# Patient Record
Sex: FEMALE | Race: WHITE | NOT HISPANIC OR LATINO | URBAN - METROPOLITAN AREA
[De-identification: names, ages, dates, MRNs, and addresses within clinical notes are randomized per-mention and may not be internally consistent; named-entity substitution may affect disease eponyms.]

---

## 2022-12-30 ENCOUNTER — PROBLEM (OUTPATIENT)
Dept: URBAN - METROPOLITAN AREA CLINIC 79 | Facility: CLINIC | Age: 72
End: 2022-12-30

## 2022-12-30 DIAGNOSIS — H04.123: ICD-10-CM

## 2022-12-30 DIAGNOSIS — H40.1230: ICD-10-CM

## 2022-12-30 PROCEDURE — 92134 CPTRZ OPH DX IMG PST SGM RTA: CPT | Mod: NC

## 2022-12-30 PROCEDURE — 99213 OFFICE O/P EST LOW 20 MIN: CPT

## 2022-12-30 ASSESSMENT — VISUAL ACUITY
OS_SC: 20/60+2
OD_SC: 20/25-2
OS_CC: 20/40
OD_CC: 20/20

## 2022-12-30 ASSESSMENT — TONOMETRY
OS_IOP_MMHG: 5
OD_IOP_MMHG: 15

## 2023-01-13 ENCOUNTER — FOLLOW UP (OUTPATIENT)
Dept: URBAN - METROPOLITAN AREA CLINIC 79 | Facility: CLINIC | Age: 73
End: 2023-01-13

## 2023-01-13 DIAGNOSIS — H04.123: ICD-10-CM

## 2023-01-13 DIAGNOSIS — H18.452: ICD-10-CM

## 2023-01-13 PROCEDURE — 99213 OFFICE O/P EST LOW 20 MIN: CPT

## 2023-01-13 ASSESSMENT — TONOMETRY
OD_IOP_MMHG: 23
OS_IOP_MMHG: 6
OD_IOP_MMHG: 23

## 2023-01-13 ASSESSMENT — VISUAL ACUITY
OD_SC: 20/30+2
OS_SC: 20/60-2
OS_PH: 20/30

## 2023-02-03 ENCOUNTER — FOLLOW UP (OUTPATIENT)
Dept: URBAN - METROPOLITAN AREA CLINIC 79 | Facility: CLINIC | Age: 73
End: 2023-02-03

## 2023-02-03 DIAGNOSIS — H04.123: ICD-10-CM

## 2023-02-03 DIAGNOSIS — H02.881: ICD-10-CM

## 2023-02-03 DIAGNOSIS — H02.884: ICD-10-CM

## 2023-02-03 DIAGNOSIS — H40.1230: ICD-10-CM

## 2023-02-03 PROCEDURE — 92012 INTRM OPH EXAM EST PATIENT: CPT

## 2023-02-03 ASSESSMENT — TONOMETRY
OS_IOP_MMHG: 7
OS_IOP_MMHG: 5
OD_IOP_MMHG: 24
OD_IOP_MMHG: 24

## 2023-02-03 ASSESSMENT — VISUAL ACUITY
OS_SC: 20/60+2
OD_SC: 20/30-2
OS_PH: 20/40+1

## 2023-03-03 ENCOUNTER — FOLLOW UP (OUTPATIENT)
Dept: URBAN - METROPOLITAN AREA CLINIC 79 | Facility: CLINIC | Age: 73
End: 2023-03-03

## 2023-03-03 DIAGNOSIS — H25.13: ICD-10-CM

## 2023-03-03 DIAGNOSIS — H04.123: ICD-10-CM

## 2023-03-03 DIAGNOSIS — H18.452: ICD-10-CM

## 2023-03-03 DIAGNOSIS — H02.884: ICD-10-CM

## 2023-03-03 DIAGNOSIS — H40.1230: ICD-10-CM

## 2023-03-03 DIAGNOSIS — H02.881: ICD-10-CM

## 2023-03-03 PROCEDURE — 92012 INTRM OPH EXAM EST PATIENT: CPT

## 2023-03-03 ASSESSMENT — VISUAL ACUITY
OD_SC: 20/30+1
OS_SC: 20/60-2

## 2023-03-03 ASSESSMENT — TONOMETRY
OD_IOP_MMHG: 20
OS_IOP_MMHG: 5

## 2023-05-12 ENCOUNTER — FOLLOW UP (OUTPATIENT)
Dept: URBAN - METROPOLITAN AREA CLINIC 79 | Facility: CLINIC | Age: 73
End: 2023-05-12

## 2023-05-12 DIAGNOSIS — H04.123: ICD-10-CM

## 2023-05-12 DIAGNOSIS — H02.881: ICD-10-CM

## 2023-05-12 DIAGNOSIS — H02.884: ICD-10-CM

## 2023-05-12 PROCEDURE — 92012 INTRM OPH EXAM EST PATIENT: CPT

## 2023-05-12 ASSESSMENT — TONOMETRY
OS_IOP_MMHG: 15
OD_IOP_MMHG: 33
OD_IOP_MMHG: 26
OS_IOP_MMHG: 16

## 2023-05-12 ASSESSMENT — VISUAL ACUITY
OS_SC: 20/40
OS_PH: 20/30
OD_SC: 20/30

## 2023-07-05 ENCOUNTER — PROBLEM (OUTPATIENT)
Dept: URBAN - METROPOLITAN AREA CLINIC 79 | Facility: CLINIC | Age: 73
End: 2023-07-05

## 2023-07-05 DIAGNOSIS — H02.884: ICD-10-CM

## 2023-07-05 DIAGNOSIS — H25.13: ICD-10-CM

## 2023-07-05 DIAGNOSIS — H02.881: ICD-10-CM

## 2023-07-05 DIAGNOSIS — H04.123: ICD-10-CM

## 2023-07-05 PROCEDURE — 92134 CPTRZ OPH DX IMG PST SGM RTA: CPT | Mod: NC

## 2023-07-05 PROCEDURE — 92012 INTRM OPH EXAM EST PATIENT: CPT

## 2023-07-05 ASSESSMENT — TONOMETRY
OS_IOP_MMHG: 10
OD_IOP_MMHG: 25
OD_IOP_MMHG: 27
OS_IOP_MMHG: 14

## 2023-07-05 ASSESSMENT — VISUAL ACUITY
OD_PH: 20/25+2
OS_PH: 20/30
OD_SC: 20/30
OS_SC: 20/50-2

## 2024-07-08 ENCOUNTER — HOSPITAL ENCOUNTER (OUTPATIENT)
Dept: HOSPITAL 99 - HWRAD | Age: 74
End: 2024-07-08
Payer: MEDICARE

## 2024-07-08 DIAGNOSIS — E04.1: Primary | ICD-10-CM

## 2024-08-09 ENCOUNTER — HOSPITAL ENCOUNTER (OUTPATIENT)
Dept: HOSPITAL 99 - RCS | Age: 74
End: 2024-08-09
Payer: MEDICARE

## 2024-08-09 DIAGNOSIS — H25.812: Primary | ICD-10-CM

## 2024-08-27 ENCOUNTER — OFFICE VISIT (OUTPATIENT)
Dept: GASTROENTEROLOGY | Facility: CLINIC | Age: 74
End: 2024-08-27
Payer: MEDICARE

## 2024-08-27 VITALS
HEIGHT: 62 IN | DIASTOLIC BLOOD PRESSURE: 82 MMHG | SYSTOLIC BLOOD PRESSURE: 122 MMHG | BODY MASS INDEX: 20.06 KG/M2 | WEIGHT: 109 LBS

## 2024-08-27 DIAGNOSIS — R15.1 FECAL SOILING: ICD-10-CM

## 2024-08-27 DIAGNOSIS — R19.8 ALTERNATING CONSTIPATION AND DIARRHEA: Primary | ICD-10-CM

## 2024-08-27 DIAGNOSIS — Z86.010 PERSONAL HISTORY OF COLONIC POLYPS: ICD-10-CM

## 2024-08-27 PROCEDURE — 99203 OFFICE O/P NEW LOW 30 MIN: CPT | Performed by: STUDENT IN AN ORGANIZED HEALTH CARE EDUCATION/TRAINING PROGRAM

## 2024-08-27 RX ORDER — ESCITALOPRAM OXALATE 10 MG/1
10 TABLET ORAL DAILY
COMMUNITY

## 2024-08-27 RX ORDER — CLONAZEPAM 1 MG/1
1 TABLET ORAL DAILY
COMMUNITY

## 2024-08-27 NOTE — PATIENT INSTRUCTIONS
1.  I am concerned that you are having constipation given your alternating constipation and diarrhea.  Given that you did not respond to MiraLAX in the past and had issues with the Metamucil you can consider over-the-counter stool softener such as Colace.  If you perform more natural route you can try certain foods including prunes, dates and kiwi fruit.  Keep a food is actually been shown to be very good for constipation and regulating of the bowels, it is recommended to take 1 to 2/week.    2.  We will get some testing including a C-reactive protein to look for body inflammation, fecal calprotectin for colonic inflammation and a breath test to rule out small intestinal bacterial overgrowth and intestinal methane agenic overgrowth.    3.  We will plan to see back in 2 to 3 months for reassessment.  If you continue to have hemorrhoidal symptoms we can also talk about banding treatments.

## 2024-08-27 NOTE — PROGRESS NOTES
Consultation - [facility]     Indira Henley 74 y.o. female MRN: [id number]  Unit/Bed#:  Encounter: [id number]    Consults    ASSESSMENT and PLAN    1. Alternating constipation and diarrhea  I do suspect constipation with overflow diarrhea.  Unfortunately she has not had relief with MiraLAX in the past and Metamucil caused too many side effects.  We discussed foods which are high in fiber and good for the GI tract such as dates, prunes and kiwi fruit, 1 to 2/week.  We also discussed that she can use over-the-counter stool softeners to try to help with the symptoms.  Will check breath test for SIBO, CRP and fecal calprotectin.  - Small intestinal bacterial overgrowth  - C-reactive protein; Future  - Calprotectin,Fecal; Future  - C-reactive protein  - Calprotectin,Fecal    2. Fecal soiling  Likely hemorrhoidal, we can assess if she would need banding in the future.    3. Personal history of colonic polyps  She reportedly had colon polyps in 2019 and was advised that she does not need any more colonoscopies.  If she has ongoing symptoms we may need to consider diagnostic colonoscopy +/- EGD.      Chief Complaint   Patient presents with    Rectal leakage, mucous, diarrhea     Referred by Dr. Rowena Alfonso  Pt questions if she has SIBO         HPI  Indira Henley is a 74 y.o. year old female with a past medical history of polyps, hemorrhoids, who presents for new GI issues including alternating constipation, diarrhea, mucousy stool which is foul-smelling and anal leakage.  Symptoms began recently when she was on a trip to [place].  They have improved somewhat but she is still having off-and-on constipation diarrhea and occasional leakage.  She saw her primary care doctor who did baseline test including blood work, C. difficile and parasite screening which was negative.  She also has a decrease in appetite and has been losing some weight however she runs 5 miles per day.  She had a colonoscopy at  "age 51 which was normal and another one in 2019 in [place] with small polyps and was advised at that time that she did not need anymore.  She is not having any significant upper GI symptoms.  She has had a rectocele repair.    She has had constipation previously and tried MiraLAX which did not work for her.  Metamucil/fiber supplementation led to too much bloating and other side effects.  She has her doctor about SIBO testing and was referred here.    GI History:  Prior Colonoscopy: Age 16 normal, 2019 small polyps, advised no need for recall  Prior EGD:  No prior EGD  Family hx:  No reported first degree relatives with colorectal cancer  Surgical hx: Rectocele repair  Blood thinners: Denies antiplatelet or anticoagulation use  NSAID use: Denies regular NSAID use  DM meds: None  Social Hx: No regular ETOH, smoking, or drug use.          Historical Information   Past Medical History:   Diagnosis Date    Colon polyp      Past Surgical History:   Procedure Laterality Date    COLONOSCOPY      HYSTERECTOMY      PARATHYROIDECTOMY      REPAIR RECTOCELE      TOTAL HIP ARTHROPLASTY Bilateral     UPPER GASTROINTESTINAL ENDOSCOPY       Social History   Social History     Substance and Sexual Activity   Alcohol Use Yes     Social History     Substance and Sexual Activity   Drug Use Not on file     Social History     Tobacco Use   Smoking Status Former    Types: Cigarettes   Smokeless Tobacco Never     Family History   Problem Relation Age of Onset    Colon cancer Neg Hx     Colon polyps Neg Hx        Meds/Allergies     No current facility-administered medications for this visit.     Not in a hospital admission.    No Known Allergies    PHYSICAL EXAM    Visit Vitals  /82   Ht 5' 2\" (1.575 m)   Wt 49.4 kg (109 lb)   BMI 19.94 kg/m²   Smoking Status Former   BSA 1.48 m²     Body mass index is 19.94 kg/m².  General Appearance: NAD, cooperative, alert  Eyes: Anicteric, EOMI  ENT: Normocephalic, atraumatic, normal mucosa  Neck: " "symmetrical, trachea midline  Lungs: clear to auscultation, non-labored respirations on room air, no wheezing  CV: S1 S2+ radial pulses bilaterally  GI:  Soft, non-tender, non-distended; normal bowel sounds; no masses, no organomegaly   Rectal: Deferred  Musculoskeletal: No gross deformities or pitting edema  Skin:  No jaundice, no rashes  Neurologic: awake, alert, oriented, no gross deficits    Lab Results   Component Value Date    CALCIUM 10.2 04/24/2023    K 4.9 04/24/2023    CO2 29 04/24/2023     (L) 04/24/2023    BUN 23 (H) 04/24/2023    CREATININE 0.81 04/24/2023     No results found for: \"WBC\", \"HGB\", \"MCV\", \"PLT\"  No results found for: \"ALT\", \"AST\", \"GGT\", \"ALKPHOS\", \"TBILI\"  No results found for: \"AMYLASE\"  No results found for: \"LIPASE\"  No results found for: \"IRON\", \"TIBC\", \"FERRITIN\"  No results found for: \"INR\"    No results found.    Imaging Studies: I have personally reviewed pertinent reports.      Pathology, and Other Studies: I have personally reviewed pertinent reports.      REVIEW OF SYSTEMS    CONSTITUTIONAL: negative unless stated in HPI  GASTROINTESTINAL: As noted in the HPI        "

## 2024-09-05 LAB — CRP SERPL-MCNC: <3 MG/L

## 2024-09-06 ENCOUNTER — TELEPHONE (OUTPATIENT)
Age: 74
End: 2024-09-06

## 2024-09-06 ENCOUNTER — OFFICE VISIT (OUTPATIENT)
Dept: GASTROENTEROLOGY | Facility: CLINIC | Age: 74
End: 2024-09-06
Payer: MEDICARE

## 2024-09-06 DIAGNOSIS — R19.7 DIARRHEA, UNSPECIFIED TYPE: ICD-10-CM

## 2024-09-06 DIAGNOSIS — K59.00 CONSTIPATION, UNSPECIFIED CONSTIPATION TYPE: Primary | ICD-10-CM

## 2024-09-06 PROCEDURE — 91065 BREATH HYDROGEN/METHANE TEST: CPT | Performed by: STUDENT IN AN ORGANIZED HEALTH CARE EDUCATION/TRAINING PROGRAM

## 2024-09-06 NOTE — TELEPHONE ENCOUNTER
Patients GI provider:  Dr. Jean-Baptiste    Number to return call: [phone]     Reason for call: Pt calling finished breath test and will drop it off at the office today.     Scheduled procedure/appointment date if applicable: Appt 11/20

## 2024-09-09 NOTE — PROGRESS NOTES
[facility]       Bacterial Overgrowth Analytical Record    Indira Crew 74 y.o. female MRN: [id number]      Date of Test: 09/05/2024    Substrate Given: Lactulose    Ordering Provider: Glenn Jean-Baptiste DO    Medical Assistant: BHASKAR    Symptoms: diarrhea alternating with constipation    The patient presents for bacterial overgrowth testing.    Patient fasted overnight. Baseline readings obtained.   Breath test performed every 20 min for a total of 3 hr    Sample Clock Time ppmH2 ppmCH4 Co2% Deepti   Baseline 1105   3 0 3.3 1.66   #1  20 minutes 1130 4 0 4.0 1.37   #2  40 minutes 1151 4 0 4.1 1.34   #3  60 minutes 1212 5 0 4.0 1.37   #4  80 minutes 1234 6 0 3.4 1.61   #5  100 minutes 1255 3 0 3.7 1.48   #6  120 minutes 116 3 0 4.1 1.34   #7  140 minutes 137 4 0 4.4 1.25   #8  160 minutes 158 5 0 4.2 1.30   #9  180 minutes 218 13 0 3.3 1.66       Physician interpretation: Negative for SIBO

## 2024-09-10 ENCOUNTER — TELEPHONE (OUTPATIENT)
Dept: GASTROENTEROLOGY | Facility: CLINIC | Age: 74
End: 2024-09-10

## 2024-09-17 LAB — CALPROTECTIN STL-MCNT: 17 MCG/G

## 2024-12-02 ENCOUNTER — OFFICE VISIT (OUTPATIENT)
Dept: GASTROENTEROLOGY | Facility: CLINIC | Age: 74
End: 2024-12-02
Payer: MEDICARE

## 2024-12-02 VITALS
HEIGHT: 62 IN | WEIGHT: 114 LBS | DIASTOLIC BLOOD PRESSURE: 76 MMHG | SYSTOLIC BLOOD PRESSURE: 118 MMHG | BODY MASS INDEX: 20.98 KG/M2

## 2024-12-02 DIAGNOSIS — Z86.0100 PERSONAL HISTORY OF COLON POLYPS, UNSPECIFIED: ICD-10-CM

## 2024-12-02 DIAGNOSIS — R19.8 ALTERNATING CONSTIPATION AND DIARRHEA: Primary | ICD-10-CM

## 2024-12-02 PROCEDURE — 99213 OFFICE O/P EST LOW 20 MIN: CPT | Performed by: STUDENT IN AN ORGANIZED HEALTH CARE EDUCATION/TRAINING PROGRAM

## 2024-12-02 RX ORDER — SERTRALINE HYDROCHLORIDE 25 MG/1
25 TABLET, FILM COATED ORAL DAILY
COMMUNITY

## 2024-12-02 NOTE — PROGRESS NOTES
Name: Indira Henley      : 1950      MRN: [id number]  Encounter Provider: Glenn Jean-Baptiste DO  Encounter Date: 2024   Encounter department: [facility]  :  Assessment & Plan  Alternating constipation and diarrhea  Symptoms have essentially improved.  It is curious that she had a colonoscopy 2019 with polyps and was advised that she did not need another one.  I offered to do another colonoscopy to ensure there were no contributing factors but she would like to hold off as she did not enjoy the experience.  We did discuss options and she would like to try FOBT which was ordered for her.  We discussed that if she has a positive test will need to proceed with colonoscopy and she is amenable.  Orders:    Occult blood 1-3, stool; Future    Personal history of colon polyps, unspecified  As above       Will follow-up on basis of FOBT and if symptoms recur    History of Present Illness     HPI  Indira Henley is a 74 y.o. female who presents for follow-up of alternating constipation diarrhea and fecal soilage.  Since her last visit her symptoms have essentially resolved.  She had been taking Ritalin as needed and stopped taking this and things seem to have improved.  She is not seeing any rectal bleeding.  She did have a colonoscopy 2019 with removal of polyps but was told she did not need another one.  [place]  No other red flag signs or symptoms.  She does report having pelvic prolapse in the past including rectocele which was treated surgically.  History obtained from: patient    Review of Systems  Current Outpatient Medications on File Prior to Visit   Medication Sig Dispense Refill    clonazePAM (KlonoPIN) 1 mg tablet Take 1 mg by mouth daily      conjugated estrogens (PREMARIN) 0.625 mg tablet Take 0.625 mg by mouth daily Take daily for 21 days then do not take for 7 days.      sertraline (ZOLOFT) 25 mg tablet Take 25 mg by mouth daily      escitalopram  "(LEXAPRO) 10 mg tablet Take 10 mg by mouth daily (Patient not taking: Reported on 12/2/2024)       No current facility-administered medications on file prior to visit.      Social History     Tobacco Use    Smoking status: Former     Types: Cigarettes    Smokeless tobacco: Never   Vaping Use    Vaping status: Never Used   Substance and Sexual Activity    Alcohol use: Yes    Drug use: Not on file    Sexual activity: Not on file        Objective   /76   Ht 5' 2\" (1.575 m)   Wt 51.7 kg (114 lb)   BMI 20.85 kg/m²      Physical Exam  General Appearance: NAD, cooperative, alert  Eyes: Anicteric  GI:  Soft, non-tender, non-distended; normal bowel sounds; no masses, no organomegaly   Rectal: Deferred  Musculoskeletal: No edema.  Skin:     No jaundice    "

## 2024-12-04 ENCOUNTER — HOSPITAL ENCOUNTER (OUTPATIENT)
Dept: HOSPITAL 99 - RCS | Age: 74
End: 2024-12-04
Payer: MEDICARE

## 2024-12-04 DIAGNOSIS — H25.812: ICD-10-CM

## 2024-12-04 DIAGNOSIS — H25.811: Primary | ICD-10-CM

## 2025-02-10 ENCOUNTER — RESULTS FOLLOW-UP (OUTPATIENT)
Dept: GASTROENTEROLOGY | Facility: CLINIC | Age: 75
End: 2025-02-10

## 2025-04-13 ENCOUNTER — HOSPITAL ENCOUNTER (OUTPATIENT)
Dept: HOSPITAL 99 - MRI 3T | Age: 75
End: 2025-04-13
Payer: MEDICARE

## 2025-04-13 DIAGNOSIS — R41.3: Primary | ICD-10-CM

## 2025-04-13 DIAGNOSIS — F33.1: ICD-10-CM

## 2025-04-13 DIAGNOSIS — Z96.643: ICD-10-CM

## 2025-04-13 DIAGNOSIS — R53.83: ICD-10-CM

## 2025-04-13 DIAGNOSIS — E21.3: ICD-10-CM

## 2025-04-13 DIAGNOSIS — E04.1: ICD-10-CM

## 2025-06-26 ENCOUNTER — NURSE TRIAGE (OUTPATIENT)
Age: 75
End: 2025-06-26

## 2025-06-26 NOTE — TELEPHONE ENCOUNTER
Having fecal leakage/soilage for several months but is getting worse.  Used to happen when walking long distance but now happens all the time.  Using cotton ball at anus and pads.  Has to wear diaper if goes out.  Hx of rectal prolapse.  OV made for 6/30.  Will discuss then.

## 2025-06-30 ENCOUNTER — OFFICE VISIT (OUTPATIENT)
Dept: GASTROENTEROLOGY | Facility: CLINIC | Age: 75
End: 2025-06-30
Payer: MEDICARE

## 2025-06-30 ENCOUNTER — TELEPHONE (OUTPATIENT)
Dept: GASTROENTEROLOGY | Facility: CLINIC | Age: 75
End: 2025-06-30

## 2025-06-30 VITALS
BODY MASS INDEX: 21.49 KG/M2 | DIASTOLIC BLOOD PRESSURE: 74 MMHG | HEIGHT: 61 IN | WEIGHT: 113.8 LBS | SYSTOLIC BLOOD PRESSURE: 130 MMHG

## 2025-06-30 DIAGNOSIS — Z98.890 HISTORY OF REPAIR OF RECTOCELE: ICD-10-CM

## 2025-06-30 DIAGNOSIS — R19.8 CHANGE IN BOWEL FUNCTION: ICD-10-CM

## 2025-06-30 DIAGNOSIS — R15.9 INCONTINENCE OF FECES, UNSPECIFIED FECAL INCONTINENCE TYPE: ICD-10-CM

## 2025-06-30 DIAGNOSIS — E61.1 IRON DEFICIENCY: ICD-10-CM

## 2025-06-30 DIAGNOSIS — Z86.0100 PERSONAL HISTORY OF COLON POLYPS, UNSPECIFIED: Primary | ICD-10-CM

## 2025-06-30 PROCEDURE — 99214 OFFICE O/P EST MOD 30 MIN: CPT | Performed by: STUDENT IN AN ORGANIZED HEALTH CARE EDUCATION/TRAINING PROGRAM

## 2025-06-30 RX ORDER — SODIUM CHLORIDE, SODIUM LACTATE, POTASSIUM CHLORIDE, CALCIUM CHLORIDE 600; 310; 30; 20 MG/100ML; MG/100ML; MG/100ML; MG/100ML
125 INJECTION, SOLUTION INTRAVENOUS CONTINUOUS
OUTPATIENT
Start: 2025-06-30

## 2025-06-30 RX ORDER — METHYLPHENIDATE HYDROCHLORIDE 20 MG/1
1 TABLET ORAL 2 TIMES DAILY
COMMUNITY
Start: 2025-05-09

## 2025-06-30 RX ORDER — SODIUM CHLORIDE, SODIUM LACTATE, POTASSIUM CHLORIDE, CALCIUM CHLORIDE 600; 310; 30; 20 MG/100ML; MG/100ML; MG/100ML; MG/100ML
125 INJECTION, SOLUTION INTRAVENOUS CONTINUOUS
Status: CANCELLED | OUTPATIENT
Start: 2025-06-30

## 2025-06-30 RX ORDER — ESTRADIOL 0.75 MG/1.25G
1.25 GEL, METERED TOPICAL DAILY
COMMUNITY

## 2025-06-30 NOTE — PROGRESS NOTES
Name: Indira Henley      : 1950      MRN: [id number]  Encounter Provider: Glenn Jean-Baptiste DO  Encounter Date: 2025   Encounter department: [facility]  :  Assessment & Plan  Incontinence of feces, unspecified fecal incontinence type  She has recurrence of fecal incontinence mainly with exercising.  Given her history of rectocele and description of symptoms I do suspect pelvic floor disorder.  She last had a colonoscopy  with polyps were removed and reportedly tattooed although advised she did not need a follow-up.  Given recurrence of symptoms and her history, we will proceed with colonoscopy to ensure no structural/mucosal abnormalities contributing to her symptoms or putting her at risk for colon cancer.    KERI performed today with small anterior anal skin tag, mild internal hemorrhoids.  No prolapse.  No masses or abscesses were noted.    Given her pelvic floor history and suspected contribution to her symptoms, will request follow-up with Dr. Morley and if consideration for further testing such as anorectal manometry.       Personal history of colon polyps, unspecified  As above, states she had polyps removed which were tattooed but did not require follow-up.  Given her ongoing symptoms including incontinence, changes in stool and reported history of positive chronic tattoo, proceed with colonoscopy.  Patient is amenable.  Orders:    Colonoscopy; Future    Change in bowel function  Suspect this is due to her pelvic floor issues contributing to dyssynergy defecation, referral as above.  Proceed with colonoscopy as above.  Orders:    Colonoscopy; Future    EGD; Future    Iron deficiency  She exercises regularly and has a balanced diet but states she has B12 and iron deficiency as noted on outside labs, unable to access at this time.  Given that we are can proceed with colonoscopy, will add on an upper endoscopy due to her history of iron deficiency and  evaluate for any enteropathy or atrophic gastritis that could contribute to her B12 abnormalities.  Orders:    Colonoscopy; Future    EGD; Future    History of repair of rectocele  As above, this was already corrected but concerned that there is ongoing pelvic floor pathology given her symptoms including changes in bowels and ongoing fecal soilage.         Assessment & Plan    History of Present Illness   History of Present Illness  Indira is a pleasant 75-year-old female with past medical history of rectocele status post surgical Parasolve years ago presenting for change in bowel habits and fecal leakage.    She reports experiencing significant stool leakage during physical activities such as walking or running, with a higher frequency during walks. This has necessitated the use of cotton balls and pads for protection. Her bowel movements are irregular, alternating between constipation and diarrhea, often accompanied by foul-smelling mucus. She also notes the presence of an external growth around the rectal muscles, which is not painful. Occasionally, her stools float. She has been managing her diarrhea with Imodium.    She had a rectocele repair surgery in 2007 at [place], [place], which she believes may be related to her current symptoms. Prior to the surgery, she experienced stool impaction into her vagina, which improved post-surgery. She finds that physical exercise, particularly jumping, provides some relief. She reports no urinary symptoms, although she occasionally feels the need to urinate when swishing water in her mouth while brushing her teeth. She has a history of low B12 levels and mild anemia.    Her last colonoscopy in 2019 revealed polyps, which were subsequently removed. FOBT earlier this year negative.    PAST SURGICAL HISTORY:  Rectocele repair surgery in 2007 at [place], [place].    SOCIAL HISTORY  Exercise: Running and walking  History obtained from: patient  Review of Systems  "A complete review of systems is negative other than that noted above in the HPI.    Medications Ordered Prior to Encounter[1]   Social History[2]  Current Medications[2]  Objective   /74   Ht 5' 1\" (1.549 m)   Wt 51.6 kg (113 lb 12.8 oz)   BMI 21.50 kg/m²     Physical Exam  General Appearance: NAD, cooperative, alert  Eyes: Anicteric  GI:  Soft, non-tender, non-distended; normal bowel sounds; no masses, no organomegaly   Rectal: Normal tone, anterior skin tag, mild internal hemorrhoids, no abscess, no mass, no prolapse  Musculoskeletal: No edema.  Skin:     No jaundice      Results  Labs   - Stool testing for blood: 12/2024, Negative   - B12 levels: Low   - Complete Blood Count (CBC): Slight anemia  Lab Results: I personally reviewed relevant lab results.                  [1]   Current Outpatient Medications on File Prior to Visit   Medication Sig Dispense Refill    clonazePAM (KlonoPIN) 1 mg tablet Take 1 mg by mouth in the morning. 3 mg. Daily  .      escitalopram (LEXAPRO) 10 mg tablet Take 10 mg by mouth daily      Estradiol (Estrogel) 0.75 MG/1.25 GM (0.06%) topical gel Place 1.25 g on the skin daily      methylphenidate (RITALIN) 20 MG tablet Take 1 tablet by mouth in the morning and 1 tablet before bedtime.      conjugated estrogens (PREMARIN) 0.625 mg tablet Take 0.625 mg by mouth daily Take daily for 21 days then do not take for 7 days. (Patient not taking: Reported on 6/30/2025)      sertraline (ZOLOFT) 25 mg tablet Take 25 mg by mouth daily (Patient not taking: Reported on 6/30/2025)       No current facility-administered medications on file prior to visit.   [2]   Social History  Tobacco Use    Smoking status: Former     Types: Cigarettes    Smokeless tobacco: Never   Vaping Use    Vaping status: Never Used   Substance and Sexual Activity    Alcohol use: Yes   [2]   Current Outpatient Medications   Medication Sig Dispense Refill    clonazePAM (KlonoPIN) 1 mg tablet Take 1 mg by mouth in the " morning. 3 mg. Daily  .      escitalopram (LEXAPRO) 10 mg tablet Take 10 mg by mouth daily      Estradiol (Estrogel) 0.75 MG/1.25 GM (0.06%) topical gel Place 1.25 g on the skin daily      methylphenidate (RITALIN) 20 MG tablet Take 1 tablet by mouth in the morning and 1 tablet before bedtime.      conjugated estrogens (PREMARIN) 0.625 mg tablet Take 0.625 mg by mouth daily Take daily for 21 days then do not take for 7 days. (Patient not taking: Reported on 6/30/2025)      sertraline (ZOLOFT) 25 mg tablet Take 25 mg by mouth daily (Patient not taking: Reported on 6/30/2025)       No current facility-administered medications for this visit.

## 2025-06-30 NOTE — TELEPHONE ENCOUNTER
Scheduled date of combo (as of today):8/14/25  Physician performing combo:Dr Jean-Baptiste  Location of combo:Endo  Bowel prep reviewed with patient:Miralax/Ducolax  Instructions reviewed with patient by:Natalee  Clearances: N/A

## 2025-06-30 NOTE — ASSESSMENT & PLAN NOTE
As above, this was already corrected but concerned that there is ongoing pelvic floor pathology given her symptoms including changes in bowels and ongoing fecal soilage.

## 2025-07-08 ENCOUNTER — HOSPITAL ENCOUNTER (OUTPATIENT)
Dept: HOSPITAL 99 - RCS | Age: 75
End: 2025-07-08
Payer: MEDICARE

## 2025-07-08 DIAGNOSIS — Z01.818: Primary | ICD-10-CM

## 2025-07-22 ENCOUNTER — ANESTHESIA EVENT (OUTPATIENT)
Dept: SURGERY | Facility: HOSPITAL | Age: 75
Setting detail: HOSPITAL OUTPATIENT SURGERY
End: 2025-07-22

## 2025-07-23 ENCOUNTER — HOSPITAL ENCOUNTER (OUTPATIENT)
Facility: HOSPITAL | Age: 75
Setting detail: HOSPITAL OUTPATIENT SURGERY
Discharge: HOME | End: 2025-07-23
Attending: OPHTHALMOLOGY | Admitting: OPHTHALMOLOGY

## 2025-07-23 ENCOUNTER — ANESTHESIA (OUTPATIENT)
Dept: SURGERY | Facility: HOSPITAL | Age: 75
Setting detail: HOSPITAL OUTPATIENT SURGERY
End: 2025-07-23

## 2025-07-23 PROCEDURE — 25000000 HC PHARMACY GENERAL: Performed by: DENTIST

## 2025-07-23 PROCEDURE — 25800000 HC PHARMACY IV SOLUTIONS

## 2025-07-23 PROCEDURE — 63600000 HC DRUGS/DETAIL CODE: Mod: JZ | Performed by: DENTIST

## 2025-07-23 RX ORDER — ONDANSETRON HYDROCHLORIDE 2 MG/ML
INJECTION, SOLUTION INTRAVENOUS AS NEEDED
Status: DISCONTINUED | OUTPATIENT
Start: 2025-07-23 | End: 2025-07-23 | Stop reason: SURG

## 2025-07-23 RX ORDER — EPHEDRINE SULFATE 50 MG/ML
INJECTION, SOLUTION INTRAVENOUS AS NEEDED
Status: DISCONTINUED | OUTPATIENT
Start: 2025-07-23 | End: 2025-07-23 | Stop reason: SURG

## 2025-07-23 RX ORDER — TRANEXAMIC ACID 10 MG/ML
INJECTION, SOLUTION INTRAVENOUS AS NEEDED
Status: DISCONTINUED | OUTPATIENT
Start: 2025-07-23 | End: 2025-07-23 | Stop reason: SURG

## 2025-07-23 RX ORDER — DEXAMETHASONE SODIUM PHOSPHATE 4 MG/ML
INJECTION, SOLUTION INTRA-ARTICULAR; INTRALESIONAL; INTRAMUSCULAR; INTRAVENOUS; SOFT TISSUE AS NEEDED
Status: DISCONTINUED | OUTPATIENT
Start: 2025-07-23 | End: 2025-07-23 | Stop reason: SURG

## 2025-07-23 RX ORDER — MIDAZOLAM HYDROCHLORIDE 2 MG/2ML
INJECTION, SOLUTION INTRAMUSCULAR; INTRAVENOUS AS NEEDED
Status: DISCONTINUED | OUTPATIENT
Start: 2025-07-23 | End: 2025-07-23 | Stop reason: SURG

## 2025-07-23 RX ORDER — FENTANYL CITRATE 50 UG/ML
INJECTION, SOLUTION INTRAMUSCULAR; INTRAVENOUS AS NEEDED
Status: DISCONTINUED | OUTPATIENT
Start: 2025-07-23 | End: 2025-07-23 | Stop reason: SURG

## 2025-07-23 RX ORDER — CEFAZOLIN SODIUM 2 G/100ML
INJECTION, SOLUTION INTRAVENOUS AS NEEDED
Status: DISCONTINUED | OUTPATIENT
Start: 2025-07-23 | End: 2025-07-23 | Stop reason: SURG

## 2025-07-23 RX ORDER — PROPOFOL 200MG/20ML
SYRINGE (ML) INTRAVENOUS AS NEEDED
Status: DISCONTINUED | OUTPATIENT
Start: 2025-07-23 | End: 2025-07-23 | Stop reason: SURG

## 2025-07-23 RX ORDER — LIDOCAINE HYDROCHLORIDE 10 MG/ML
INJECTION, SOLUTION EPIDURAL; INFILTRATION; INTRACAUDAL; PERINEURAL AS NEEDED
Status: DISCONTINUED | OUTPATIENT
Start: 2025-07-23 | End: 2025-07-23 | Stop reason: SURG

## 2025-07-23 RX ADMIN — FENTANYL CITRATE 50 MCG: 50 INJECTION, SOLUTION INTRAMUSCULAR; INTRAVENOUS at 11:24

## 2025-07-23 RX ADMIN — PROPOFOL 50 MG: 10 INJECTION, EMULSION INTRAVENOUS at 11:26

## 2025-07-23 RX ADMIN — ONDANSETRON HYDROCHLORIDE 4 MG: 2 SOLUTION INTRAMUSCULAR; INTRAVENOUS at 12:52

## 2025-07-23 RX ADMIN — SODIUM CHLORIDE: 9 INJECTION, SOLUTION INTRAVENOUS at 12:51

## 2025-07-23 RX ADMIN — EPHEDRINE SULFATE 5 MG: 50 INJECTION, SOLUTION INTRAVENOUS at 12:45

## 2025-07-23 RX ADMIN — FENTANYL CITRATE 50 MCG: 50 INJECTION, SOLUTION INTRAMUSCULAR; INTRAVENOUS at 11:30

## 2025-07-23 RX ADMIN — EPHEDRINE SULFATE 10 MG: 50 INJECTION, SOLUTION INTRAVENOUS at 11:39

## 2025-07-23 RX ADMIN — SODIUM CHLORIDE: 9 INJECTION, SOLUTION INTRAVENOUS at 11:19

## 2025-07-23 RX ADMIN — TRANEXAMIC ACID 1000 MG: 10 INJECTION, SOLUTION INTRAVENOUS at 11:30

## 2025-07-23 RX ADMIN — PROPOFOL 150 MG: 10 INJECTION, EMULSION INTRAVENOUS at 11:24

## 2025-07-23 RX ADMIN — EPHEDRINE SULFATE 10 MG: 50 INJECTION, SOLUTION INTRAVENOUS at 11:53

## 2025-07-23 RX ADMIN — FENTANYL CITRATE 25 MCG: 50 INJECTION, SOLUTION INTRAMUSCULAR; INTRAVENOUS at 12:13

## 2025-07-23 RX ADMIN — MIDAZOLAM HYDROCHLORIDE 2 MG: 1 INJECTION, SOLUTION INTRAMUSCULAR; INTRAVENOUS at 11:19

## 2025-07-23 RX ADMIN — DEXAMETHASONE SODIUM PHOSPHATE 8 MG: 4 INJECTION, SOLUTION INTRA-ARTICULAR; INTRALESIONAL; INTRAMUSCULAR; INTRAVENOUS; SOFT TISSUE at 11:33

## 2025-07-23 RX ADMIN — EPHEDRINE SULFATE 10 MG: 50 INJECTION, SOLUTION INTRAVENOUS at 12:05

## 2025-07-23 RX ADMIN — CEFAZOLIN SODIUM 2 G: 2 INJECTION, SOLUTION INTRAVENOUS at 11:28

## 2025-07-23 RX ADMIN — LIDOCAINE HYDROCHLORIDE 5 ML: 10 INJECTION, SOLUTION EPIDURAL; INFILTRATION; INTRACAUDAL; PERINEURAL at 11:24

## 2025-07-23 ASSESSMENT — ENCOUNTER SYMPTOMS: DEPRESSION: 1

## 2025-07-23 NOTE — OR SURGEON
Pre-Procedure patient identification:  I am the primary operating surgeon/proceduralist and I have reviewed the applicable pathology reports and radiology studies for this procedure. I have identified the patient on 07/23/25 at 11:07 AM Alcides Diaz MD  Phone Number: [phone]

## 2025-07-23 NOTE — CONSULTS
PT seen and examined at bedside. Paper chart reviewed including pre-op H&P. There are no updates to the patient's H&P at this time.   Pt stable for surgery, proceed with procedure as planned.   Alcides Diaz MD

## 2025-07-23 NOTE — ANESTHESIOLOGIST PRE-PROCEDURE ATTESTATION
Pre-Procedure Patient Identification:  I am the Primary Anesthesiologist and have identified the patient on 07/23/25 at 11:06 AM.   I have confirmed the procedure(s) will be performed by the following surgeon/proceduralist Alcides Diaz MD.

## 2025-07-23 NOTE — ANESTHESIA PREPROCEDURE EVALUATION
Relevant Problems   No relevant active problems       Anesthesia ROS/MED HX    Anesthesia History    Previous anesthetics  No history of anesthetic complications  Pulmonary - neg  Neuro/Psych    Depression  Cardiovascular   ECG reviewed  Hematological - neg  GI/Hepatic  No GERD  Musculoskeletal- neg  Renal Disease- neg  Endo/Other- neg  ROS/MED HX Comments:    ROS/Hx: 2 FOS no CP no SOB       Dr. Dennison PCP note for medical clearance    Past Surgical History   Procedure Laterality Date    Cataract extraction w/ intraocular lens implant Left     Glaucoma surgery      several    Hysterectomy      Parathyroidectomy      2 removed    Total hip arthroplasty Bilateral        Physical Exam    Airway   Mallampati: II   TM distance: >3 FB   Neck ROM: full  Cardiovascular - normal   Rhythm: regular   Rate: normalPulmonary - normal   clear to auscultation  Dental - normal        Anesthesia Plan    Plan: general    Technique: general LMA     Lines and Monitors: PIV     2 ASA  Anesthetic plan and risks discussed with: patient  Induction:    intravenous   Postop Plan:   Patient Disposition: phase II then home   Pain Management: IV analgesics  Comments:    Plan: Discussed risks and benefits of general anesthesia with patient. Risks discussed including but not limited to sore throat, injury to lips,teeth and mouth. Discussed possibility of additional IV access, arterial line placement, central line placement, and remaining intubated post-operatively. All questions answered and patient agrees to proceed.

## 2025-07-23 NOTE — OP NOTE
Procedure Note    This is an operative report    Preoperative diagnosis: dermatochalasis both upper eyelids, brow descent both sides    Postoperative diagnosis: same    Procedures: bilateral cosmetic upper eyelid blepharoplasties with cosmetic pretrichial browpexies both sides    Attending surgeon: Dr. Alcides Diaz MD    Complications: None    Anesthesia: lma/local    Specimens:none    Beginning operative report:      After the risks benefits and alternatives to the planned procedures were discussed with the patient informed consent was obtained.    Surgical consent was reviewed this morning in the office as well to patients satisfaction.  The patient was identified in preoperative holding area by the attending surgeon Dr. Alcides Diaz and the operative site correctly marked as both upper lids and the brows.  In the sitting position ellipses of pretrichial skin to be excised were marked. The patient had significant hairline scarring from previous surgeries and the incision was attempted to fold into these defects.  Anesthesia options were reviewed 3x. The patient expressed concern with the lower face and was reminded no mid/lower face intervention would be performed today.  The patient was then brought into the operative suite where IV sedation was administered. The relevant surgical sites were injected with 2% lidocaine with epinepherine and they were cleaned prepped and draped in standard sterile ophthalmic fashion.      Attention was turned to the upper eyelids where calipers were used to set the upper eyelid crease to about 8 mm corresponding with the patient's natural eyelid creases.  This was done with a sterile marking pen.  Using a standard skin pinch technique crescent-shaped areas of upper eyelid skin deemed excessive and safe to excise were demarcated with sterile marking pens.      The skin incisions were then scored with a #15 blades.  Attention was turned to the left upper lid where the  scored skin and underlying muscle was excised with blunt tip Conner scissors.  Hemostasis was controlled now and throughout the remainder of the case with gentle cautery.  The underlying orbital septum was incised with scissors in the pre aponeurotic fat pads were allowed to prolapse anteriorly.  These were excised with cautery and Khris's where excessive and otherwise sculpted.  The underlying levator aponeurosis was identified and avoided and left uninjured.    The fat pads were sculpted. More on the right than on the left given the patient's complaints. The fat on the left was sculpted at it's base and left adherent to the levator to bolster the patietns fullness with a local advancement.   Once this was achieved an ideal cosmetic and anatomic symmetry was noted between the lids. The skin incisions were closed with running and interrupted 6-0 nylon sutures.    Attention was then turned to the hairline incisions.  To the left side first.  The previously marked skin was scored with a 15 blade and the skin and underlying dermis excised with monopolar cautery.  Given how tight the patient was and the large amount of subcutaneous scarring encountered with the scoring, the medial portion of the skin was left intact to be included in the closure to avoid undue tension. With hemostasis obtained the subepidermal plane was dissected within the dermis from the underlying muscular tissue.  This potential plane  the dissection plane from the neurovascular bundles in a completely soft plane.  Retractors were inserted and the safe plane was identified under direct visualization.  Blunt malleable's, blunt Norwood's, and cotton tip applicators were used to completely bluntly dissected this plane until an ideal release extending towards the brow horn was obtained which allowed for closure of the wound in layered fashion with an ideal brow results and no tension.  Layered closure of the dermis was performed with 5-0  Vicryl suture and the skin was closed with skin staples.  The same exact thing was performed on the contralateral side with ideal anatomic and cosmetic results and symmetry obtained.    After all final procedures were done the surgical field was inspected.  The incisions were seen to be clean and well approximated with all sutures intact.  Antibiotic steroid ophthalmic ointment was placed into the eyes and skin incisions.  The patient was successfully awoken from anesthesia and taken to the postoperative recovery area in stable condition having tolerated the procedure well.  There were no complications.    Alcides Diaz MD

## 2025-07-23 NOTE — ANESTHESIA PROCEDURE NOTES
Airway  Reason: elective    Start Time: 7/23/2025 11:27 AM    General Information and Staff   Patient location during procedure: OR  Resident/CRNA: Nina Luis CRNA  Performed: resident/CRNA   Performed by: Nina Luis CRNA  Authorized by: Melia Sierra MD        Patient Condition  Indications for airway management: anesthesia  Sedation level: deep     Final Airway Details   Preoxygenated: yes  Final airway type: supraglottic airway  Successful airway: iGel  Size: 4   Ventilation between attempts: none  Number of attempts at approach: 1  Number of other approaches attempted: 0  Atraumatic airway insertion      Additional Comments  Lips and teeth intact post LMA placement

## 2025-07-23 NOTE — ANESTHESIA POSTPROCEDURE EVALUATION
Patient: Krystin Carter    Procedure Summary       Date: 07/23/25 Room / Location: [facility]    Anesthesia Start: 1118 Anesthesia Stop: 1334    Procedure: Cosmetic Blepharoplasty BUL, Cosmetic bilateral pretrichial browlift (Bilateral) Diagnosis:       Encounter for cosmetic surgery      (Cosmetic z41.1)    Surgeons: Alcides Diaz MD Responsible Provider: Melia Sierra MD    Anesthesia Type: general ASA Status: 2            Anesthesia Type: general  PACU Vitals  7/23/2025 1326 - 7/23/2025 1426        7/23/2025  1328 7/23/2025  1345 7/23/2025  1400 7/23/2025  1415    BP: 162/74 170/77 154/71 154/70    Temp: 36.3 °C (97.3 °F) -- -- 36.5 °C (97.7 °F)    Pulse: 90 84 82 80    Resp: 18 17 15 16    SpO2: 100 % 99 % 99 % 97 %              Anesthesia Post Evaluation    Pain management: adequate  Patient location during evaluation: PACU  Patient participation: complete - patient participated  Level of consciousness: awake  Cardiovascular status: acceptable  Airway Patency: adequate  Respiratory status: acceptable  Hydration status: acceptable  Anesthetic complications: no

## 2025-07-30 ENCOUNTER — TELEPHONE (OUTPATIENT)
Dept: GASTROENTEROLOGY | Facility: CLINIC | Age: 75
End: 2025-07-30

## 2025-08-20 ENCOUNTER — ANESTHESIA EVENT (OUTPATIENT)
Dept: ANESTHESIOLOGY | Facility: AMBULATORY SURGERY CENTER | Age: 75
End: 2025-08-20

## 2025-08-20 ENCOUNTER — ANESTHESIA (OUTPATIENT)
Dept: ANESTHESIOLOGY | Facility: AMBULATORY SURGERY CENTER | Age: 75
End: 2025-08-20

## 2025-08-21 ENCOUNTER — TELEPHONE (OUTPATIENT)
Dept: GASTROENTEROLOGY | Facility: CLINIC | Age: 75
End: 2025-08-21